# Patient Record
Sex: MALE | Race: WHITE | NOT HISPANIC OR LATINO | URBAN - METROPOLITAN AREA
[De-identification: names, ages, dates, MRNs, and addresses within clinical notes are randomized per-mention and may not be internally consistent; named-entity substitution may affect disease eponyms.]

---

## 2023-03-06 PROBLEM — R53.83 FATIGUE: Status: ACTIVE | Noted: 2023-03-06

## 2023-03-06 PROBLEM — E05.90 SUBCLINICAL HYPERTHYROIDISM: Status: ACTIVE | Noted: 2023-03-06

## 2023-03-06 PROBLEM — G47.00 INSOMNIA: Status: ACTIVE | Noted: 2023-03-06

## 2023-03-06 PROBLEM — F06.4 ANXIETY DISORDER DUE TO MEDICAL CONDITION: Status: ACTIVE | Noted: 2023-03-06

## 2023-03-06 PROBLEM — N52.9 ERECTILE DYSFUNCTION: Status: ACTIVE | Noted: 2023-03-06

## 2023-03-06 PROBLEM — F32.1 DEPRESSION, MAJOR, SINGLE EPISODE, MODERATE (MULTI): Status: ACTIVE | Noted: 2023-03-06

## 2023-03-06 RX ORDER — SILDENAFIL 50 MG/1
1 TABLET, FILM COATED ORAL DAILY PRN
COMMUNITY
Start: 2022-03-01 | End: 2023-03-24 | Stop reason: SDUPTHER

## 2023-03-06 RX ORDER — BUPROPION HYDROCHLORIDE 300 MG/1
1 TABLET ORAL DAILY
COMMUNITY
Start: 2020-09-15 | End: 2023-04-07 | Stop reason: SDUPTHER

## 2023-03-06 RX ORDER — VENLAFAXINE HYDROCHLORIDE 75 MG/1
1 CAPSULE, EXTENDED RELEASE ORAL DAILY
COMMUNITY
Start: 2022-03-01 | End: 2023-03-24

## 2023-03-24 ENCOUNTER — OFFICE VISIT (OUTPATIENT)
Dept: PRIMARY CARE | Facility: CLINIC | Age: 38
End: 2023-03-24
Payer: OTHER GOVERNMENT

## 2023-03-24 VITALS
DIASTOLIC BLOOD PRESSURE: 90 MMHG | TEMPERATURE: 97.1 F | OXYGEN SATURATION: 98 % | WEIGHT: 224 LBS | HEART RATE: 79 BPM | BODY MASS INDEX: 31.36 KG/M2 | SYSTOLIC BLOOD PRESSURE: 129 MMHG | HEIGHT: 71 IN

## 2023-03-24 DIAGNOSIS — F32.1 DEPRESSION, MAJOR, SINGLE EPISODE, MODERATE (MULTI): Primary | ICD-10-CM

## 2023-03-24 DIAGNOSIS — N52.9 ERECTILE DYSFUNCTION, UNSPECIFIED ERECTILE DYSFUNCTION TYPE: ICD-10-CM

## 2023-03-24 PROCEDURE — 99213 OFFICE O/P EST LOW 20 MIN: CPT | Performed by: FAMILY MEDICINE

## 2023-03-24 RX ORDER — SERTRALINE HYDROCHLORIDE 100 MG/1
100 TABLET, FILM COATED ORAL DAILY
Qty: 30 TABLET | Refills: 1 | Status: SHIPPED | OUTPATIENT
Start: 2023-03-24 | End: 2023-06-01

## 2023-03-24 RX ORDER — SILDENAFIL 50 MG/1
50 TABLET, FILM COATED ORAL DAILY PRN
Qty: 10 TABLET | Refills: 1 | Status: SHIPPED | OUTPATIENT
Start: 2023-03-24 | End: 2023-06-01

## 2023-03-24 ASSESSMENT — PATIENT HEALTH QUESTIONNAIRE - PHQ9
1. LITTLE INTEREST OR PLEASURE IN DOING THINGS: SEVERAL DAYS
SUM OF ALL RESPONSES TO PHQ9 QUESTIONS 1 AND 2: 2
10. IF YOU CHECKED OFF ANY PROBLEMS, HOW DIFFICULT HAVE THESE PROBLEMS MADE IT FOR YOU TO DO YOUR WORK, TAKE CARE OF THINGS AT HOME, OR GET ALONG WITH OTHER PEOPLE: SOMEWHAT DIFFICULT
2. FEELING DOWN, DEPRESSED OR HOPELESS: SEVERAL DAYS

## 2023-03-24 NOTE — PROGRESS NOTES
"Subjective   Patient ID: Mayank Hassan is a 38 y.o. male who presents for Med Refill.    Assessment/Plan     Problem List Items Addressed This Visit          Genitourinary    Erectile dysfunction    Relevant Medications    sildenafil (Viagra) 50 mg tablet       Other    Depression, major, single episode, moderate (CMS/HCC) - Primary    Relevant Medications    sertraline (Zoloft) 100 mg tablet   Will consider switching to Zoloft as patient states it is family Zoloft has been helpful  If Zoloft is not helpful in couple of months then we will switch to low-dose Rexulti    Patient agrees    HPI    38-year-old male here for depression anxiety  Patient says Effexor not helping much with anxiety depression patient increase the dose to 150 mg and still not helpful taking Wellbutrin daily    ED needs generic Viagra    No suicidal homicidal ideation hallucination    No Known Allergies    Current Outpatient Medications   Medication Sig Dispense Refill    buPROPion XL (Wellbutrin XL) 300 mg 24 hr tablet Take 1 tablet (300 mg) by mouth once daily.      sertraline (Zoloft) 100 mg tablet Take 1 tablet (100 mg) by mouth once daily. Start with half tablet daily for first 10 days then 1 tablet daily 30 tablet 1    sildenafil (Viagra) 50 mg tablet Take 1 tablet (50 mg) by mouth once daily as needed for erectile dysfunction. 1 hour before needed 10 tablet 1     No current facility-administered medications for this visit.       Objective   Visit Vitals  /90 (BP Location: Left arm, Patient Position: Sitting)   Pulse 79   Temp 36.2 °C (97.1 °F)   Ht 1.803 m (5' 11\")   Wt 102 kg (224 lb)   SpO2 98%   BMI 31.24 kg/m²   Smoking Status Former   BSA 2.26 m²     Physical Exam  Constitutional:       General: He is not in acute distress.     Appearance: Normal appearance.   HENT:      Head: Normocephalic and atraumatic.      Nose: Nose normal.   Eyes:      Extraocular Movements: Extraocular movements intact.      Conjunctiva/sclera: " Conjunctivae normal.   Cardiovascular:      Rate and Rhythm: Normal rate and regular rhythm.   Pulmonary:      Effort: Pulmonary effort is normal.      Breath sounds: Normal breath sounds.   Skin:     General: Skin is warm.   Neurological:      Mental Status: He is alert and oriented to person, place, and time.   Psychiatric:         Mood and Affect: Mood normal.         Behavior: Behavior normal.   Immunization History   Administered Date(s) Administered    Hep B, Unspecified 05/21/2002, 06/25/2002    Influenza, seasonal, injectable 09/15/2020    Molly SARS-CoV-2 Vaccination 12/24/2021    Meningococcal B, Unspecified 05/21/2022    Pfizer Purple Cap SARS-CoV-2 10/22/2021, 12/30/2021       Review of Systems    No visits with results within 4 Month(s) from this visit.   Latest known visit with results is:   [facility] Encounter on 10/26/2020   Component Date Value Ref Range Status    Free T4 10/26/2020 0.97  0.78 - 1.48 ng/dL Final    WBC 10/26/2020 5.9  4.4 - 11.3 x10E9/L Final    nRBC 10/26/2020 0.0  0.0 - 0.0 /100 WBC Final    RBC 10/26/2020 4.87  4.50 - 5.90 x10E12/L Final    Hemoglobin 10/26/2020 15.5  13.5 - 17.5 g/dL Final    Hematocrit 10/26/2020 46.2  41.0 - 52.0 % Final    MCV 10/26/2020 95  80 - 100 fL Final    MCHC 10/26/2020 33.5  32.0 - 36.0 g/dL Final    Platelets 10/26/2020 169  150 - 450 x10E9/L Final    RDW 10/26/2020 12.6  11.5 - 14.5 % Final    Neutrophils % 10/26/2020 68.0  40.0 - 80.0 % Final    Immature Granulocytes %, Automated 10/26/2020 0.3  0.0 - 0.9 % Final    Lymphocytes % 10/26/2020 20.9  13.0 - 44.0 % Final    Monocytes % 10/26/2020 7.9  2.0 - 10.0 % Final    Eosinophils % 10/26/2020 2.4  0.0 - 6.0 % Final    Basophils % 10/26/2020 0.5  0.0 - 2.0 % Final    Neutrophils Absolute 10/26/2020 4.04  1.20 - 7.70 x10E9/L Final    Lymphocytes Absolute 10/26/2020 1.24  1.20 - 4.80 x10E9/L Final    Monocytes Absolute 10/26/2020 0.47  0.10 - 1.00 x10E9/L Final    Eosinophils Absolute 10/26/2020 0.14   0.00 - 0.70 x10E9/L Final    Basophils Absolute 10/26/2020 0.03  0.00 - 0.10 x10E9/L Final    TSH 10/26/2020 0.17 (L)  0.44 - 3.98 mIU/L Final    Glucose 10/26/2020 89  74 - 99 mg/dL Final    Sodium 10/26/2020 140  136 - 145 mmol/L Final    Potassium 10/26/2020 4.1  3.5 - 5.3 mmol/L Final    Chloride 10/26/2020 104  98 - 107 mmol/L Final    Bicarbonate 10/26/2020 27  21 - 32 mmol/L Final    Anion Gap 10/26/2020 13  10 - 20 mmol/L Final    Urea Nitrogen 10/26/2020 13  6 - 23 mg/dL Final    Creatinine 10/26/2020 0.93  0.50 - 1.30 mg/dL Final    GLOMERULAR FILTRATION RATE-NON AFR* 10/26/2020 >60  >60 mL/min/1.73m2 Final    GLOMERULAR FILTRATION RATE-* 10/26/2020 >60  >60 mL/min/1.73m2 Final    Calcium 10/26/2020 10.0  8.6 - 10.6 mg/dL Final    Albumin 10/26/2020 4.6  3.4 - 5.0 g/dL Final    Alkaline Phosphatase 10/26/2020 65  33 - 120 U/L Final    Total Protein 10/26/2020 7.2  6.4 - 8.2 g/dL Final    AST 10/26/2020 24  9 - 39 U/L Final    Total Bilirubin 10/26/2020 0.8  0.0 - 1.2 mg/dL Final    ALT (SGPT) 10/26/2020 23  10 - 52 U/L Final    Vitamin D, 25-Hydroxy 10/26/2020 26 (A)  ng/mL Final       Radiology: Reviewed imaging in powerchart.  No results found.    Family History   Problem Relation Name Age of Onset    Other (cardiac disorder) Mother      Hypertension Father          essential hypertension    Anxiety disorder Father      Anxiety disorder Sibling       Social History     Socioeconomic History    Marital status: Single     Spouse name: None    Number of children: None    Years of education: None    Highest education level: None   Occupational History    None   Tobacco Use    Smoking status: Former     Packs/day: 1.00     Years: 20.00     Pack years: 20.00     Types: Cigarettes    Smokeless tobacco: Current    Tobacco comments:     VAPING   Vaping Use    Vaping status: None   Substance and Sexual Activity    Alcohol use: Never    Drug use: Never    Sexual activity: None   Other Topics Concern    None    Social History Narrative    None     Social Determinants of Health     Financial Resource Strain: Not on file   Food Insecurity: Not on file   Transportation Needs: Not on file   Physical Activity: Not on file   Stress: Not on file   Social Connections: Not on file   Intimate Partner Violence: Not on file   Housing Stability: Not on file     No past medical history on file.  No past surgical history on file.    Charting was completed using voice recognition technology and may include unintended errors.

## 2023-04-07 DIAGNOSIS — F32.1 DEPRESSION, MAJOR, SINGLE EPISODE, MODERATE (MULTI): ICD-10-CM

## 2023-04-09 RX ORDER — BUPROPION HYDROCHLORIDE 300 MG/1
300 TABLET ORAL DAILY
Qty: 90 TABLET | Refills: 2 | Status: SHIPPED | OUTPATIENT
Start: 2023-04-09 | End: 2023-06-21 | Stop reason: SDUPTHER

## 2023-05-03 ENCOUNTER — OFFICE VISIT (OUTPATIENT)
Dept: PRIMARY CARE | Facility: CLINIC | Age: 38
End: 2023-05-03
Payer: OTHER GOVERNMENT

## 2023-05-03 VITALS
DIASTOLIC BLOOD PRESSURE: 71 MMHG | WEIGHT: 226 LBS | BODY MASS INDEX: 31.64 KG/M2 | HEIGHT: 71 IN | TEMPERATURE: 97.1 F | HEART RATE: 67 BPM | OXYGEN SATURATION: 97 % | SYSTOLIC BLOOD PRESSURE: 110 MMHG

## 2023-05-03 DIAGNOSIS — G89.29 CHRONIC MIDLINE LOW BACK PAIN WITHOUT SCIATICA: ICD-10-CM

## 2023-05-03 DIAGNOSIS — M25.562 CHRONIC PAIN OF LEFT KNEE: ICD-10-CM

## 2023-05-03 DIAGNOSIS — M54.50 CHRONIC MIDLINE LOW BACK PAIN WITHOUT SCIATICA: ICD-10-CM

## 2023-05-03 DIAGNOSIS — F32.1 DEPRESSION, MAJOR, SINGLE EPISODE, MODERATE (MULTI): Primary | ICD-10-CM

## 2023-05-03 DIAGNOSIS — F06.4 ANXIETY DISORDER DUE TO MEDICAL CONDITION: ICD-10-CM

## 2023-05-03 DIAGNOSIS — G89.29 CHRONIC PAIN OF LEFT KNEE: ICD-10-CM

## 2023-05-03 PROCEDURE — 99214 OFFICE O/P EST MOD 30 MIN: CPT | Performed by: FAMILY MEDICINE

## 2023-05-03 NOTE — PROGRESS NOTES
Subjective   Patient ID: Mayank Hassan is a 38 y.o. male who presents for Depression (C/o Knee pain & back pain).    Assessment/Plan     Problem List Items Addressed This Visit          Nervous    Chronic pain of left knee    Relevant Orders    XR knee left 1-2 views       Other    Anxiety disorder due to medical condition    Depression, major, single episode, moderate (CMS/HCC) - Primary    Chronic midline low back pain without sciatica     Reassurance was provided  Consider left knee x-ray  Discussed about exercise  Psychology information provided  Follow-up with 3 to 6 months  Come back early with any new signs and symptoms  Patient understands and agrees with plan.  Patient agrees    HPI    38-year-old male here for depression anxiety  Patient says Effexor not helping much with anxiety depression patient increase the dose to 150 mg and still not helpful taking Wellbutrin daily  Medication was switched to Zoloft from Effexor and plan to add Rexulti as an agent continue Wellbutrin    Running out of Wellbutrin since last 1 month patient says he is doing okay  Complaining of chronic low back pain with occasional radiation mostly stays in the lower back and worse with activity worse with bending  He is also complaining of chronic left knee pain mainly on medial aspect and sometimes in the back part no Baker's cyst on exam        ED needs generic Viagra    No suicidal homicidal ideation hallucination    No Known Allergies    Current Outpatient Medications   Medication Sig Dispense Refill    sertraline (Zoloft) 100 mg tablet Take 1 tablet (100 mg) by mouth once daily. Start with half tablet daily for first 10 days then 1 tablet daily 30 tablet 1    buPROPion XL (Wellbutrin XL) 300 mg 24 hr tablet Take 1 tablet (300 mg) by mouth once daily. 90 tablet 2    sildenafil (Viagra) 50 mg tablet Take 1 tablet (50 mg) by mouth once daily as needed for erectile dysfunction. 1 hour before needed 10 tablet 1     No current  "facility-administered medications for this visit.       Objective   Visit Vitals  /71 (BP Location: Left arm, Patient Position: Sitting)   Pulse 67   Temp 36.2 °C (97.1 °F)   Ht 1.803 m (5' 11\")   Wt 103 kg (226 lb)   SpO2 97%   BMI 31.52 kg/m²   Smoking Status Former   BSA 2.27 m²     Physical Exam  Constitutional:       General: He is not in acute distress.     Appearance: Normal appearance.   HENT:      Head: Normocephalic and atraumatic.      Nose: Nose normal.   Eyes:      Extraocular Movements: Extraocular movements intact.      Conjunctiva/sclera: Conjunctivae normal.   Cardiovascular:      Rate and Rhythm: Normal rate and regular rhythm.   Pulmonary:      Effort: Pulmonary effort is normal.      Breath sounds: Normal breath sounds.   Skin:     General: Skin is warm.   Neurological:      Mental Status: He is alert and oriented to person, place, and time.   Psychiatric:         Mood and Affect: Mood normal.         Behavior: Behavior normal.     Left knee examined LS-spine exam essentially within normal limits ligaments intact  Immunization History   Administered Date(s) Administered    Hep B, Unspecified 05/21/2002, 06/25/2002    Influenza, seasonal, injectable 09/15/2020    Molly SARS-CoV-2 Vaccination 12/24/2021    Meningococcal B, Unspecified 05/21/2022    Pfizer Purple Cap SARS-CoV-2 10/22/2021, 12/30/2021       Review of Systems    No visits with results within 4 Month(s) from this visit.   Latest known visit with results is:   [facility]acy Encounter on 10/26/2020   Component Date Value Ref Range Status    Free T4 10/26/2020 0.97  0.78 - 1.48 ng/dL Final    WBC 10/26/2020 5.9  4.4 - 11.3 x10E9/L Final    nRBC 10/26/2020 0.0  0.0 - 0.0 /100 WBC Final    RBC 10/26/2020 4.87  4.50 - 5.90 x10E12/L Final    Hemoglobin 10/26/2020 15.5  13.5 - 17.5 g/dL Final    Hematocrit 10/26/2020 46.2  41.0 - 52.0 % Final    MCV 10/26/2020 95  80 - 100 fL Final    MCHC 10/26/2020 33.5  32.0 - 36.0 g/dL Final    Platelets " 10/26/2020 169  150 - 450 x10E9/L Final    RDW 10/26/2020 12.6  11.5 - 14.5 % Final    Neutrophils % 10/26/2020 68.0  40.0 - 80.0 % Final    Immature Granulocytes %, Automated 10/26/2020 0.3  0.0 - 0.9 % Final    Lymphocytes % 10/26/2020 20.9  13.0 - 44.0 % Final    Monocytes % 10/26/2020 7.9  2.0 - 10.0 % Final    Eosinophils % 10/26/2020 2.4  0.0 - 6.0 % Final    Basophils % 10/26/2020 0.5  0.0 - 2.0 % Final    Neutrophils Absolute 10/26/2020 4.04  1.20 - 7.70 x10E9/L Final    Lymphocytes Absolute 10/26/2020 1.24  1.20 - 4.80 x10E9/L Final    Monocytes Absolute 10/26/2020 0.47  0.10 - 1.00 x10E9/L Final    Eosinophils Absolute 10/26/2020 0.14  0.00 - 0.70 x10E9/L Final    Basophils Absolute 10/26/2020 0.03  0.00 - 0.10 x10E9/L Final    TSH 10/26/2020 0.17 (L)  0.44 - 3.98 mIU/L Final    Glucose 10/26/2020 89  74 - 99 mg/dL Final    Sodium 10/26/2020 140  136 - 145 mmol/L Final    Potassium 10/26/2020 4.1  3.5 - 5.3 mmol/L Final    Chloride 10/26/2020 104  98 - 107 mmol/L Final    Bicarbonate 10/26/2020 27  21 - 32 mmol/L Final    Anion Gap 10/26/2020 13  10 - 20 mmol/L Final    Urea Nitrogen 10/26/2020 13  6 - 23 mg/dL Final    Creatinine 10/26/2020 0.93  0.50 - 1.30 mg/dL Final    GLOMERULAR FILTRATION RATE-NON AFR* 10/26/2020 >60  >60 mL/min/1.73m2 Final    GLOMERULAR FILTRATION RATE-* 10/26/2020 >60  >60 mL/min/1.73m2 Final    Calcium 10/26/2020 10.0  8.6 - 10.6 mg/dL Final    Albumin 10/26/2020 4.6  3.4 - 5.0 g/dL Final    Alkaline Phosphatase 10/26/2020 65  33 - 120 U/L Final    Total Protein 10/26/2020 7.2  6.4 - 8.2 g/dL Final    AST 10/26/2020 24  9 - 39 U/L Final    Total Bilirubin 10/26/2020 0.8  0.0 - 1.2 mg/dL Final    ALT (SGPT) 10/26/2020 23  10 - 52 U/L Final    Vitamin D, 25-Hydroxy 10/26/2020 26 (A)  ng/mL Final       Radiology: Reviewed imaging in powerchart.  No results found.    Family History   Problem Relation Name Age of Onset    Other (cardiac disorder) Mother      Hypertension  Father          essential hypertension    Anxiety disorder Father      Anxiety disorder Sibling       Social History     Socioeconomic History    Marital status: Single     Spouse name: None    Number of children: None    Years of education: None    Highest education level: None   Occupational History    None   Tobacco Use    Smoking status: Former     Packs/day: 1.00     Years: 20.00     Pack years: 20.00     Types: Cigarettes    Smokeless tobacco: Current    Tobacco comments:     VAPING   Vaping Use    Vaping status: None   Substance and Sexual Activity    Alcohol use: Never    Drug use: Never    Sexual activity: None   Other Topics Concern    None   Social History Narrative    None     Social Determinants of Health     Financial Resource Strain: Not on file   Food Insecurity: Not on file   Transportation Needs: Not on file   Physical Activity: Not on file   Stress: Not on file   Social Connections: Not on file   Intimate Partner Violence: Not on file   Housing Stability: Not on file     History reviewed. No pertinent past medical history.  History reviewed. No pertinent surgical history.    Charting was completed using voice recognition technology and may include unintended errors.

## 2023-05-31 DIAGNOSIS — F32.1 DEPRESSION, MAJOR, SINGLE EPISODE, MODERATE (MULTI): ICD-10-CM

## 2023-05-31 DIAGNOSIS — N52.9 ERECTILE DYSFUNCTION, UNSPECIFIED ERECTILE DYSFUNCTION TYPE: ICD-10-CM

## 2023-06-01 RX ORDER — SILDENAFIL 50 MG/1
TABLET, FILM COATED ORAL
Qty: 10 TABLET | Refills: 1 | Status: SHIPPED | OUTPATIENT
Start: 2023-06-01

## 2023-06-01 RX ORDER — SERTRALINE HYDROCHLORIDE 100 MG/1
100 TABLET, FILM COATED ORAL DAILY
Qty: 90 TABLET | Refills: 1 | Status: SHIPPED | OUTPATIENT
Start: 2023-06-01 | End: 2024-05-31

## 2023-06-20 ENCOUNTER — TELEPHONE (OUTPATIENT)
Dept: PRIMARY CARE | Facility: CLINIC | Age: 38
End: 2023-06-20
Payer: OTHER GOVERNMENT

## 2023-06-20 DIAGNOSIS — F32.1 DEPRESSION, MAJOR, SINGLE EPISODE, MODERATE (MULTI): ICD-10-CM

## 2023-06-21 RX ORDER — BUPROPION HYDROCHLORIDE 300 MG/1
300 TABLET ORAL DAILY
Qty: 90 TABLET | Refills: 2 | Status: SHIPPED | OUTPATIENT
Start: 2023-06-21 | End: 2024-06-20

## 2023-07-25 ENCOUNTER — TELEPHONE (OUTPATIENT)
Dept: PRIMARY CARE | Facility: CLINIC | Age: 38
End: 2023-07-25
Payer: OTHER GOVERNMENT

## 2023-07-25 DIAGNOSIS — F32.1 DEPRESSION, MAJOR, SINGLE EPISODE, MODERATE (MULTI): ICD-10-CM

## 2023-07-26 RX ORDER — SERTRALINE HYDROCHLORIDE 50 MG/1
50 TABLET, FILM COATED ORAL DAILY
Qty: 90 TABLET | Refills: 1 | Status: SHIPPED | OUTPATIENT
Start: 2023-07-26 | End: 2024-07-25

## 2023-09-05 ENCOUNTER — TELEPHONE (OUTPATIENT)
Dept: PRIMARY CARE | Facility: CLINIC | Age: 38
End: 2023-09-05
Payer: OTHER GOVERNMENT

## 2023-09-05 DIAGNOSIS — N52.01 ERECTILE DYSFUNCTION DUE TO ARTERIAL INSUFFICIENCY: ICD-10-CM

## 2023-09-06 RX ORDER — SILDENAFIL 100 MG/1
100 TABLET, FILM COATED ORAL AS NEEDED
Qty: 10 TABLET | Refills: 2 | Status: SHIPPED | OUTPATIENT
Start: 2023-09-06 | End: 2023-10-06

## 2023-10-25 ENCOUNTER — TELEPHONE (OUTPATIENT)
Dept: PRIMARY CARE | Facility: CLINIC | Age: 38
End: 2023-10-25
Payer: OTHER GOVERNMENT